# Patient Record
Sex: FEMALE | Race: WHITE | NOT HISPANIC OR LATINO | Employment: OTHER | URBAN - METROPOLITAN AREA
[De-identification: names, ages, dates, MRNs, and addresses within clinical notes are randomized per-mention and may not be internally consistent; named-entity substitution may affect disease eponyms.]

---

## 2022-02-02 ENCOUNTER — EMERGENCY VISIT (OUTPATIENT)
Dept: URBAN - METROPOLITAN AREA CLINIC 30 | Facility: CLINIC | Age: 77
End: 2022-02-02

## 2022-02-02 DIAGNOSIS — H53.10: ICD-10-CM

## 2022-02-02 DIAGNOSIS — H04.123: ICD-10-CM

## 2022-02-02 DIAGNOSIS — H43.812: ICD-10-CM

## 2022-02-02 DIAGNOSIS — H53.8: ICD-10-CM

## 2022-02-02 PROCEDURE — 92083 EXTENDED VISUAL FIELD XM: CPT

## 2022-02-02 PROCEDURE — 99204 OFFICE O/P NEW MOD 45 MIN: CPT

## 2022-02-02 ASSESSMENT — TONOMETRY
OD_IOP_MMHG: 12
OS_IOP_MMHG: 11

## 2022-02-02 ASSESSMENT — VISUAL ACUITY
OS_CC: 20/25-1
OD_CC: 20/25-1

## 2022-02-02 NOTE — PATIENT DISCUSSION
Patient made aware of 24/7 emergency services.  PT TO CALL IF HAS ANY NEW SYMPTOMS OR IF CURRENT SYMPTOMS GET WORSE.

## 2022-03-09 ENCOUNTER — FOLLOW UP (OUTPATIENT)
Dept: URBAN - METROPOLITAN AREA CLINIC 30 | Facility: CLINIC | Age: 77
End: 2022-03-09

## 2022-03-09 DIAGNOSIS — H04.123: ICD-10-CM

## 2022-03-09 DIAGNOSIS — H53.8: ICD-10-CM

## 2022-03-09 DIAGNOSIS — H53.10: ICD-10-CM

## 2022-03-09 DIAGNOSIS — H43.812: ICD-10-CM

## 2022-03-09 PROCEDURE — 92083 EXTENDED VISUAL FIELD XM: CPT

## 2022-03-09 PROCEDURE — 99213 OFFICE O/P EST LOW 20 MIN: CPT
